# Patient Record
Sex: FEMALE
[De-identification: names, ages, dates, MRNs, and addresses within clinical notes are randomized per-mention and may not be internally consistent; named-entity substitution may affect disease eponyms.]

---

## 2017-04-22 ENCOUNTER — RX ONLY (RX ONLY)
Age: 57
End: 2017-04-22

## 2017-04-22 RX ORDER — MUPIROCIN 20 MG/G
SMALL AMOUNT OINTMENT TOPICAL TID
Qty: 1 | Refills: 0 | Status: ERX | COMMUNITY
Start: 2017-04-22

## 2017-04-22 RX ORDER — CEPHALEXIN 500 MG/1
CAPSULE ORAL
Qty: 42 | Refills: 0 | Status: ERX | COMMUNITY
Start: 2017-04-22

## 2017-05-06 ENCOUNTER — APPOINTMENT (OUTPATIENT)
Dept: URBAN - METROPOLITAN AREA CLINIC 237 | Age: 57
Setting detail: DERMATOLOGY
End: 2017-05-06

## 2017-05-06 DIAGNOSIS — L0391 CELLULITIS AND ABSCESS OF UNSPECIFIED SITES: ICD-10-CM

## 2017-05-06 DIAGNOSIS — L97 NON-PRESSURE CHRONIC ULCER OF LOWER LIMB, NOT ELSEWHERE CLASSIFIED: ICD-10-CM

## 2017-05-06 DIAGNOSIS — L0390 CELLULITIS AND ABSCESS OF UNSPECIFIED SITES: ICD-10-CM

## 2017-05-06 PROBLEM — L03.818 CELLULITIS OF OTHER SITES: Status: ACTIVE | Noted: 2017-05-06

## 2017-05-06 PROBLEM — L97.311 NON-PRESSURE CHRONIC ULCER OF RIGHT ANKLE LIMITED TO BREAKDOWN OF SKIN: Status: ACTIVE | Noted: 2017-05-06

## 2017-05-06 PROCEDURE — OTHER OTHER: OTHER

## 2017-05-06 PROCEDURE — OTHER PATIENT SPECIFIC COUNSELING: OTHER

## 2017-05-06 PROCEDURE — OTHER TREATMENT REGIMEN: OTHER

## 2017-05-06 PROCEDURE — 99213 OFFICE O/P EST LOW 20 MIN: CPT

## 2017-05-06 PROCEDURE — OTHER OBSERVATION: OTHER

## 2017-05-06 ASSESSMENT — LOCATION ZONE DERM: LOCATION ZONE: LEG

## 2017-05-06 ASSESSMENT — LOCATION SIMPLE DESCRIPTION DERM: LOCATION SIMPLE: RIGHT ANKLE

## 2017-05-06 ASSESSMENT — LOCATION DETAILED DESCRIPTION DERM: LOCATION DETAILED: RIGHT ANKLE

## 2017-05-06 ASSESSMENT — SEVERITY ASSESSMENT: SEVERITY: CLEAR

## 2017-05-06 ASSESSMENT — PAIN INTENSITY VAS: HOW INTENSE IS YOUR PAIN 0 BEING NO PAIN, 10 BEING THE MOST SEVERE PAIN POSSIBLE?: 3/10 PAIN

## 2017-05-06 NOTE — PROCEDURE: OTHER
Other (Free Text): iPhoto taken.
Detail Level: Simple
Note Text (......Xxx Chief Complaint.): This diagnosis correlates with the SKs below bra line.

## 2017-05-06 NOTE — PROCEDURE: PATIENT SPECIFIC COUNSELING
Reassured pt that swelling is improving, however, surrounding skin looked dried out. Advised to soak ankle in a basin (whirlpool-like) and gently rub to debride scale/scab.\\n\\n Discussed should have some ACE wrap or bandage for support- will help out with the pain, which MD is pretty sure is just related to the swelling.  \\n\\nDiscussed might need to check for osteomyelitis since c/o \"bone pain:\", although MD thinks it is from the edema, and the pain has decreased. Also, wound is very recent. If experiencing any more pain, will need to do XR and+/or PET scan.\\n\\nCan start apply AHU/Vaseline instead of MU to help soften. \\n\\nAdvised legs do take awhile to heal. Don't be surprised if it takes a few months.
Detail Level: Simple
Reassured pt that swelling is improved. Okay to stop MU re: cellulitis is almost resolved and BC came back negative. Use AHU/Vaseline instead.

## 2017-05-06 NOTE — HPI: WOUND
How Is Your Wound Healing?: healing slowly
Is This A New Presentation, Or A Follow-Up?: Follow Up Wound

## 2017-05-06 NOTE — PROCEDURE: TREATMENT REGIMEN
Detail Level: Simple
Initiate Treatment: AHU TID-qid \\nACE wrap
Initiate Treatment: AHU/Vaseline
Discontinue Regimen: MU BELLA